# Patient Record
Sex: FEMALE | Race: BLACK OR AFRICAN AMERICAN
[De-identification: names, ages, dates, MRNs, and addresses within clinical notes are randomized per-mention and may not be internally consistent; named-entity substitution may affect disease eponyms.]

---

## 2020-03-03 ENCOUNTER — HOSPITAL ENCOUNTER (INPATIENT)
Dept: HOSPITAL 72 - EMR | Age: 60
LOS: 2 days | Discharge: NURSING, REHAB OR LONG-TERM CARE | DRG: 280 | End: 2020-03-05
Payer: COMMERCIAL

## 2020-03-03 VITALS — SYSTOLIC BLOOD PRESSURE: 181 MMHG | DIASTOLIC BLOOD PRESSURE: 81 MMHG

## 2020-03-03 VITALS — DIASTOLIC BLOOD PRESSURE: 83 MMHG | SYSTOLIC BLOOD PRESSURE: 189 MMHG

## 2020-03-03 VITALS — SYSTOLIC BLOOD PRESSURE: 157 MMHG | DIASTOLIC BLOOD PRESSURE: 76 MMHG

## 2020-03-03 VITALS — WEIGHT: 227.5 LBS | BODY MASS INDEX: 36.56 KG/M2 | HEIGHT: 66 IN

## 2020-03-03 VITALS — DIASTOLIC BLOOD PRESSURE: 84 MMHG | SYSTOLIC BLOOD PRESSURE: 156 MMHG

## 2020-03-03 DIAGNOSIS — D64.9: ICD-10-CM

## 2020-03-03 DIAGNOSIS — E87.6: ICD-10-CM

## 2020-03-03 DIAGNOSIS — E87.0: ICD-10-CM

## 2020-03-03 DIAGNOSIS — Z88.6: ICD-10-CM

## 2020-03-03 DIAGNOSIS — I34.0: ICD-10-CM

## 2020-03-03 DIAGNOSIS — I25.10: ICD-10-CM

## 2020-03-03 DIAGNOSIS — I69.351: ICD-10-CM

## 2020-03-03 DIAGNOSIS — E43: ICD-10-CM

## 2020-03-03 DIAGNOSIS — I11.0: ICD-10-CM

## 2020-03-03 DIAGNOSIS — E78.5: ICD-10-CM

## 2020-03-03 DIAGNOSIS — Z86.73: ICD-10-CM

## 2020-03-03 DIAGNOSIS — I25.2: ICD-10-CM

## 2020-03-03 DIAGNOSIS — I27.20: ICD-10-CM

## 2020-03-03 DIAGNOSIS — Z79.84: ICD-10-CM

## 2020-03-03 DIAGNOSIS — I21.4: ICD-10-CM

## 2020-03-03 DIAGNOSIS — I50.33: ICD-10-CM

## 2020-03-03 DIAGNOSIS — Z79.02: ICD-10-CM

## 2020-03-03 DIAGNOSIS — E11.40: ICD-10-CM

## 2020-03-03 DIAGNOSIS — Z88.8: ICD-10-CM

## 2020-03-03 DIAGNOSIS — Z88.0: ICD-10-CM

## 2020-03-03 DIAGNOSIS — E86.0: ICD-10-CM

## 2020-03-03 DIAGNOSIS — E88.09: ICD-10-CM

## 2020-03-03 DIAGNOSIS — I87.2: ICD-10-CM

## 2020-03-03 DIAGNOSIS — I24.9: Primary | ICD-10-CM

## 2020-03-03 LAB
ADD MANUAL DIFF: NO
ALBUMIN SERPL-MCNC: 1.8 G/DL (ref 3.4–5)
ALBUMIN/GLOB SERPL: 0.5 {RATIO} (ref 1–2.7)
ALP SERPL-CCNC: 68 U/L (ref 46–116)
ALT SERPL-CCNC: 65 U/L (ref 12–78)
ANION GAP SERPL CALC-SCNC: 8 MMOL/L (ref 5–15)
APPEARANCE UR: (no result)
APTT BLD: 25 SEC (ref 23–33)
APTT PPP: YELLOW S
AST SERPL-CCNC: 64 U/L (ref 15–37)
BASOPHILS NFR BLD AUTO: 0.4 % (ref 0–2)
BILIRUB SERPL-MCNC: 0.1 MG/DL (ref 0.2–1)
BUN SERPL-MCNC: 12 MG/DL (ref 7–18)
CALCIUM SERPL-MCNC: 8 MG/DL (ref 8.5–10.1)
CHLORIDE SERPL-SCNC: 112 MMOL/L (ref 98–107)
CO2 SERPL-SCNC: 27 MMOL/L (ref 21–32)
CREAT SERPL-MCNC: 1.1 MG/DL (ref 0.55–1.3)
EOSINOPHIL NFR BLD AUTO: 1.6 % (ref 0–3)
ERYTHROCYTE [DISTWIDTH] IN BLOOD BY AUTOMATED COUNT: 14 % (ref 11.6–14.8)
GLOBULIN SER-MCNC: 3.8 G/DL
GLUCOSE UR STRIP-MCNC: (no result) MG/DL
HCT VFR BLD CALC: 29.2 % (ref 37–47)
HGB BLD-MCNC: 9.6 G/DL (ref 12–16)
INR PPP: 0.9 (ref 0.9–1.1)
KETONES UR QL STRIP: NEGATIVE
LEUKOCYTE ESTERASE UR QL STRIP: (no result)
LYMPHOCYTES NFR BLD AUTO: 9.6 % (ref 20–45)
MCV RBC AUTO: 86 FL (ref 80–99)
MONOCYTES NFR BLD AUTO: 7.3 % (ref 1–10)
NEUTROPHILS NFR BLD AUTO: 81.2 % (ref 45–75)
NITRITE UR QL STRIP: NEGATIVE
PH UR STRIP: 7 [PH] (ref 4.5–8)
PLATELET # BLD: 383 K/UL (ref 150–450)
POTASSIUM SERPL-SCNC: 2.9 MMOL/L (ref 3.5–5.1)
PROT UR QL STRIP: (no result)
RBC # BLD AUTO: 3.39 M/UL (ref 4.2–5.4)
SODIUM SERPL-SCNC: 147 MMOL/L (ref 136–145)
SP GR UR STRIP: 1.01 (ref 1–1.03)
UROBILINOGEN UR-MCNC: NORMAL MG/DL (ref 0–1)
WBC # BLD AUTO: 8.1 K/UL (ref 4.8–10.8)

## 2020-03-03 PROCEDURE — 83540 ASSAY OF IRON: CPT

## 2020-03-03 PROCEDURE — 82607 VITAMIN B-12: CPT

## 2020-03-03 PROCEDURE — 85730 THROMBOPLASTIN TIME PARTIAL: CPT

## 2020-03-03 PROCEDURE — 96375 TX/PRO/DX INJ NEW DRUG ADDON: CPT

## 2020-03-03 PROCEDURE — 82977 ASSAY OF GGT: CPT

## 2020-03-03 PROCEDURE — 86140 C-REACTIVE PROTEIN: CPT

## 2020-03-03 PROCEDURE — 84550 ASSAY OF BLOOD/URIC ACID: CPT

## 2020-03-03 PROCEDURE — 36415 COLL VENOUS BLD VENIPUNCTURE: CPT

## 2020-03-03 PROCEDURE — 83036 HEMOGLOBIN GLYCOSYLATED A1C: CPT

## 2020-03-03 PROCEDURE — 87086 URINE CULTURE/COLONY COUNT: CPT

## 2020-03-03 PROCEDURE — 85025 COMPLETE CBC W/AUTO DIFF WBC: CPT

## 2020-03-03 PROCEDURE — 99291 CRITICAL CARE FIRST HOUR: CPT

## 2020-03-03 PROCEDURE — 71045 X-RAY EXAM CHEST 1 VIEW: CPT

## 2020-03-03 PROCEDURE — 93005 ELECTROCARDIOGRAM TRACING: CPT

## 2020-03-03 PROCEDURE — 70551 MRI BRAIN STEM W/O DYE: CPT

## 2020-03-03 PROCEDURE — 84443 ASSAY THYROID STIM HORMONE: CPT

## 2020-03-03 PROCEDURE — 80053 COMPREHEN METABOLIC PANEL: CPT

## 2020-03-03 PROCEDURE — 93970 EXTREMITY STUDY: CPT

## 2020-03-03 PROCEDURE — 80061 LIPID PANEL: CPT

## 2020-03-03 PROCEDURE — 82550 ASSAY OF CK (CPK): CPT

## 2020-03-03 PROCEDURE — 82746 ASSAY OF FOLIC ACID SERUM: CPT

## 2020-03-03 PROCEDURE — 84484 ASSAY OF TROPONIN QUANT: CPT

## 2020-03-03 PROCEDURE — 84156 ASSAY OF PROTEIN URINE: CPT

## 2020-03-03 PROCEDURE — 81003 URINALYSIS AUTO W/O SCOPE: CPT

## 2020-03-03 PROCEDURE — 81050 URINALYSIS VOLUME MEASURE: CPT

## 2020-03-03 PROCEDURE — 85610 PROTHROMBIN TIME: CPT

## 2020-03-03 PROCEDURE — 82962 GLUCOSE BLOOD TEST: CPT

## 2020-03-03 PROCEDURE — 83880 ASSAY OF NATRIURETIC PEPTIDE: CPT

## 2020-03-03 PROCEDURE — 83735 ASSAY OF MAGNESIUM: CPT

## 2020-03-03 PROCEDURE — 82728 ASSAY OF FERRITIN: CPT

## 2020-03-03 PROCEDURE — 96365 THER/PROPH/DIAG IV INF INIT: CPT

## 2020-03-03 PROCEDURE — 84100 ASSAY OF PHOSPHORUS: CPT

## 2020-03-03 PROCEDURE — 93306 TTE W/DOPPLER COMPLETE: CPT

## 2020-03-03 PROCEDURE — 83550 IRON BINDING TEST: CPT

## 2020-03-03 RX ADMIN — HEPARIN SODIUM SCH UNITS: 5000 INJECTION INTRAVENOUS; SUBCUTANEOUS at 21:26

## 2020-03-03 RX ADMIN — INSULIN ASPART SCH UNITS: 100 INJECTION, SOLUTION INTRAVENOUS; SUBCUTANEOUS at 21:00

## 2020-03-03 RX ADMIN — SPIRONOLACTONE SCH MG: 25 TABLET, FILM COATED ORAL at 21:25

## 2020-03-03 RX ADMIN — DOCUSATE SODIUM SCH MG: 100 CAPSULE, LIQUID FILLED ORAL at 17:41

## 2020-03-03 RX ADMIN — ATORVASTATIN CALCIUM SCH MG: 80 TABLET, FILM COATED ORAL at 21:25

## 2020-03-03 RX ADMIN — INSULIN ASPART SCH UNITS: 100 INJECTION, SOLUTION INTRAVENOUS; SUBCUTANEOUS at 17:00

## 2020-03-03 NOTE — DIAGNOSTIC IMAGING REPORT
Indication: Dyspnea

 

Comparison:  None

 

A single view chest radiograph was obtained.

 

Findings:

 

There may be mild pulmonary vascular congestion present given prominent hilar vessels

with suggestion of mild cephalization. No alveolar or interstitial densities are

identified. The heart is mildly enlarged. There is a probable small right pleural

effusion. Bones are unremarkable.

 

IMPRESSION:

 

Query mild pulmonary vascular congestion

## 2020-03-03 NOTE — NUR
ED Nurse Note:

pt arrives from home states she has had a headache for awhile and feeling 
dizzy. relates drove self here from [employer]. pt denies cp at this time 
accucheck as noted, md aware and pt able to consume orange juice.  repeat 
accucheck doen with lab draw and improved. repeated orange juice as pt is a/ox4 
and able to tolerate po.  pt tolerates iv start/lab draw well.  aware to obtain 
urine sample when able. radiology here to do cxr and take pt to mri.

## 2020-03-03 NOTE — NUR
ED Nurse Note:

pt c/o K+ infusion burning.  iv site reassessed and intact no edema or redness 
noted.  infusion rate decreased to 50ml/hr for comfort and ice pack applied to 
area.

## 2020-03-03 NOTE — NUR
ED Nurse Note:

pt resting in room a/ox4.  able to use bedside commode. tolerating K+ infusion 
well as infusing at slower rate now.  no new c/o

## 2020-03-03 NOTE — CONSULTATION
Consult Note


Consult Note





I was asked to evaluate at the request of Dr. Bañuelos for electrolyte management





Patient is a 59-year-old female past medical history of diabetes, CAD status 

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 

presents to the ER complaining of feeling sick.  She states that she feels 

dizzy and generally weak.  Patient states that she woke up with left-sided 

chest pain which has since resolved.  She denies any shortness of breath.  She 

denies any new focal weakness or worsening of her right eye visual loss.  She 

denies any abdominal pain, nausea or vomiting.  She also complains of leg 

swelling.  She states that she was taken off of her Lasix by her doctor.  She 

also states that she was on other blood thinners that were taken off.  She 

states that she smokes about 2 cigarettes/day.  She works for the [employer].  She states that she might retire because she is unable to 

work after her strokes and MI.


Allergies:  





     CODEINE (Verified  Allergy, Unknown, 3/3/20)


     METFORMIN (Verified  Allergy, Unknown, 3/3/20)


     PENICILLINS (Verified  Allergy, Unknown, 3/3/20)








Past Medical History:  No History, Except For


Hx Cardiac Problems:  Yes - CHF, 2 strokes


Hx Hypertension:  Yes


Hx Diabetes:  Yes


Assessment/Plan





Electrolyte imbalances


Hypokalemia


Hypertension


Anemia


Elevated troponin


Diabetes mellitus


Coronary artery disease status post MI


With right-sided facial droop and partial bright blindness


Codeine Metformin penicillin allergy








Optimize cardiac function 


Correct electrolytes


2D echocardiogram


Monitor renal parameters


Adjust blood pressure medication


Consultants











Sawyer Altamirano MD Mar 3, 2020 15:14

## 2020-03-03 NOTE — NUR
NURSE NOTES:

Admitted patient from ER to TELE rm 220-2. Patient is AAO X4, able to make needs known. 
Denies any pain at this time, no s/s of acute distress noted. Patient denies any chest pain 
at tis time. Cardiac monitor on. IV on RAC 22g is intact and patent. Noted patient has 
bilateral ankle pitting edema x2. Skin is intact and warm on touch. Called Dr. Servin for 
admission orders, awaiting response. Will continue to monitor patient.

## 2020-03-03 NOTE — DIAGNOSTIC IMAGING REPORT
Indication: 59-year-old female history of old infarct. Patient presents with right

facial droop, dizziness and weakness

 

Technique: The head was imaged in a 1.5 Tania magnet. Sequences obtained include

sagittal and axial T1 FLAIR, axial T2 fast spin echo with fat saturation, axial T2*

GRE, axial T2 FLAIR, diffusion and ADC map.

 

Comparison: None

 

There is a magnetic susceptibility artifact obscuring portions of the parietal lobe

bilaterally. There is also resultant alteration of the magnetic field in this

location obscuring and underlying detail. That said, no mass effect or edema

identified. No definite hemorrhage identified. Diffusion restriction not seen within

the visualized part of the brain that could be evaluated. There is encephalomalacia

in the left occipital lobe. There is a periventricular T2 signal which is very mild

in degree may be due to chronic small vessel disease.

 

IMPRESSION:

 

Limited study due to artifact. No acute infarct, mass effect or edema appreciated.

 

Periventricular T2 hyperintense signal may be due to chronic small vessel disease.

 

Old infarct left occipital lobe.

## 2020-03-03 NOTE — EMERGENCY ROOM REPORT
History of Present Illness


General


Chief Complaint:  Dizziness


Source:  Patient





Present Illness


HPI


Patient is a 59-year-old female past medical history of diabetes, CAD status 

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 

presents to the ER complaining of feeling sick.  She states that she feels 

dizzy and generally weak.  Patient states that she woke up with left-sided 

chest pain which has since resolved.  She denies any shortness of breath.  She 

denies any new focal weakness or worsening of her right eye visual loss.  She 

denies any abdominal pain, nausea or vomiting.  She also complains of leg 

swelling.  She states that she was taken off of her Lasix by her doctor.  She 

also states that she was on other blood thinners that were taken off.  She 

states that she smokes about 2 cigarettes/day.  She works for the [employer].  She states that she might retire because she is unable to 

work after her strokes and MI.


Allergies:  


Coded Allergies:  


     CODEINE (Verified  Allergy, Unknown, 3/3/20)


     METFORMIN (Verified  Allergy, Unknown, 3/3/20)


     PENICILLINS (Verified  Allergy, Unknown, 3/3/20)





Patient History


Social History:  Reports: smoking


Last Menstrual Period:  menopause


Reviewed Nursing Documentation:  PMH: Agreed; PSxH: Agreed





Nursing Documentation-PMH


Past Medical History:  No History, Except For


Hx Cardiac Problems:  Yes - CHF, 2 strokes


Hx Hypertension:  Yes


Hx Diabetes:  Yes





Review of Systems


All Other Systems:  negative except mentioned in HPI





Physical Exam





Vital Signs








  Date Time  Temp Pulse Resp B/P (MAP) Pulse Ox O2 Delivery O2 Flow Rate FiO2


 


3/3/20 07:35 97.7 78 16 148/75 (99) 99 Room Air  








Sp02 EP Interpretation:  reviewed, normal


General Appearance:  no apparent distress, alert, GCS 15, non-toxic


Head:  normocephalic, atraumatic


ENT:  hearing grossly normal, normal pharynx, no angioedema, normal voice, 

other - slow clear speech


Neck:  full range of motion, supple/symm/no masses


Respiratory:  chest non-tender, crackles, speaking full sentences


Cardiovascular #1:  regular rate, rhythm, no edema


Cardiovascular #2:  2+ carotid (R), 2+ carotid (L), 2+ radial (R), 2+ radial (L)

, 2+ dorsalis pedis (R), 2+ dorsalis pedis (L)


Gastrointestinal:  normal bowel sounds, non tender, soft, non-distended, no 

guarding, no rebound


Rectal:  deferred


Genitourinary:  normal inspection, no CVA tenderness


Musculoskeletal:  back normal, normal range of motion, gait/station normal, 

other - Bilateral lower extremity swelling left greater than right with mild 

tenderness to palpation no erythema


Neurologic:  alert, motor strength/tone normal, oriented x3, sensory intact, 

responsive


Psychiatric:  judgement/insight normal, memory normal, mood/affect normal, no 

suicidal/homicidal ideation


Skin:  no rash


Lymphatic:  no adenopathy





Procedures


Critical Care Time


Critical Care Time


Total critical care time: Approximately 35 minutes.  Due to a high probability 

of clinically significant, life threatening deterioration, the patient required 

my highest level of preparedness to intervene emergently and I personally spent 

this critical care time directly and personally managing the patient. This 

critical care time included obtaining a history; examining the patient; pulse 

oximetry; ordering and review of studies; arranging urgent treatment with 

development of a management plan; evaluation of patient's response to treatment

; frequent reassessment; and, discussions with other providers.This critical 

care time was performed to assess and manage the high probability of imminent, 

life-threatening deterioration that could result in multi-organ failure. It was 

exclusive of separately billable procedures and treating other patients and 

teaching time.


Please see MDM section and the rest of the note for further information on 

patient assessment and treatment.





Medical Decision Making


Diagnostic Impression:  


 Primary Impression:  


 NSTEMI (non-ST elevated myocardial infarction)


 Additional Impressions:  


 Hypokalemia


 CHF (congestive heart failure)


 Pulmonary edema


 Anemia


ER Course


Patient's blood glucose 67.  Patient given oral orange juice





Repeat BS 70, given another orange juice and D50 ordered.  Patient states she 

checked her blood glucose this morning it was 300 so she took 14 units of her 

insulin but has not eaten anything





Patient fluid overloaded BNP almost 8000 with pulmonary vascular congestion on x

-ray.  40 mg of IV Lasix has been ordered.  Patient's troponin is also elevated 

patient has no active chest pain.  Her EKG has no ST elevation.  Patient is on 

Plavix at home.  Will hold on any further blood thinners until MRI results are 

back.  Patient is hypokalemic with a potassium of 2.9, I have ordered for 40 M 

EQ's p.o. as well as 20 M EQ's IV piggyback.  Paging admitting MD for admission

  Dr. Servin.





At 925 am Dr. Servin called back.  I told him that MRI of brain to rule out 

acute stroke was pending as well as duplex of the lower extremities.  He will 

follow-up on these results and treat as needed.  I have held on any blood 

thinners pending MRI results.


EKG Diagnostic Results


EKG Time:  08:07


EP Interpretation:  MD Noemy


Rate:  normal


Rhythm:  NSR


ST Segments:  no acute changes





Rhythm Strip Diag. Results


Rhythm Strip Time:  08:26


EP Interpretation:  yes


Rate:  76


Rhythm:  NSR, no PVC's, no ectopy





Last Vital Signs








  Date Time  Temp Pulse Resp B/P (MAP) Pulse Ox O2 Delivery O2 Flow Rate FiO2


 


3/3/20 07:35 97.7 78 16 148/75 (99) 99 Room Air  








Disposition:  ADMITTED AS INPATIENT


Condition:  Critical


Physician Consult:  Dr Servin at 925 am.


Referrals:  


NON PHYSICIAN (PCP)











Sandy Salguero M.D. Mar 3, 2020 08:26

## 2020-03-03 NOTE — NUR
ED Nurse Note:

report given to tele rn, awaits pt transport with acls protocol and rn.  no new 
c/o.

## 2020-03-03 NOTE — HISTORY AND PHYSICAL REPORT
DATE OF ADMISSION:  03/03/2020

HISTORY OF PRESENT ILLNESS:  The patient is admitted for chest pain, rule

out acute coronary syndrome.  The patient has been complaining also of

back pain as well as leg edema, worsening leg edema.  The patient has

history of stroke x2 and MI in the past.  She is also diabetic.  She is

also high-risk patient for acute coronary syndrome.  The patient also

complains of dizziness and chest pain for 3 days that radiates to the

shoulder.  Denies palpitation.  Denies shortness of breath.  Does have

mild cough.



PAST MEDICAL HISTORY:  NIDDM, neuropathy, hyperlipidemia, MI, CAD, CVA x2,

leg edema.



PAST SURGICAL HISTORY:  None.



ALLERGIES:  To metformin, penicillin, codeine.



FAMILY HISTORY:  Does have history of diabetes hypertension.



SOCIAL HISTORY:  Denies history of smoking.  Denies alcohol or illicit

drugs.



MEDICATIONS:  Plavix, gabapentin, insulin, losartan.



REVIEW OF SYSTEMS:  HEENT:  Denies headaches.  RESPIRATORY:  Denies

shortness of breath.  Does have mild cough.  CARDIOVASCULAR:  Reports

chest pain for 3 days with radiation to the left shoulder.  No

palpitations.  No orthopnea.  GASTROINTESTINAL:  Denies nausea, vomiting,

or diarrhea.  Does have occasional heartburn.  EXTREMITIES:  Does have

worsening leg edema.  CENTRAL NERVOUS SYSTEM:  Denies change in speech

pattern.



PHYSICAL EXAMINATION:

VITAL SIGNS:  Temperature is 97.9, pulse is 87, blood pressure 157/76.

HEENT:  PERRLA.

NECK:  Supple.  No lymphadenopathy.

CHEST:  Clear to auscultation.

CARDIOVASCULAR:  Regular rate and rhythm.  No murmurs or extra sounds.

GASTROINTESTINAL:  Soft, nontender, nondistended.  No organomegaly.

EXTREMITIES:  1+ edema.  Reflexes on both sides.  Has generalized

weakness.



LABORATORY DATA:  WBC of 8.9, hemoglobin 9.6, platelets 383.  Sodium 147,

potassium 3.9, BUN of 12, creatinine 1.2.  Troponin 0.119.



ASSESSMENT AND PLAN:  Hypokalemia, elevated troponin, chest pain, rule out

acute coronary syndrome, leg edema.  I have asked Dr. Chen, Dr. Altamirano,  _______ see the patient for hypokalemia as well as for

chest pain management as well as acute coronary syndrome.  The patient

also ________ for a stroke.  Has history of CVA in the past.  High risk

for stroke as well.









  ______________________________________________

  Federico Servin M.D.





DR:  CLARENCE

D:  03/03/2020 23:07

T:  03/03/2020 23:20

JOB#:  [id number]

CC:

## 2020-03-03 NOTE — NUR
NURSE NOTES:

Patient is complaining of pain. Called Dr. Servin again and left message for admission 
orders and something for pain. Will continue to monitor patient.

## 2020-03-04 VITALS — SYSTOLIC BLOOD PRESSURE: 154 MMHG | DIASTOLIC BLOOD PRESSURE: 89 MMHG

## 2020-03-04 VITALS — DIASTOLIC BLOOD PRESSURE: 88 MMHG | SYSTOLIC BLOOD PRESSURE: 152 MMHG

## 2020-03-04 VITALS — DIASTOLIC BLOOD PRESSURE: 92 MMHG | SYSTOLIC BLOOD PRESSURE: 187 MMHG

## 2020-03-04 VITALS — SYSTOLIC BLOOD PRESSURE: 147 MMHG | DIASTOLIC BLOOD PRESSURE: 86 MMHG

## 2020-03-04 LAB
% IRON SATURATION: 10 % (ref 15–50)
ADD MANUAL DIFF: NO
ALBUMIN SERPL-MCNC: 1.5 G/DL (ref 3.4–5)
ALBUMIN/GLOB SERPL: 0.5 {RATIO} (ref 1–2.7)
ALP SERPL-CCNC: 61 U/L (ref 46–116)
ALT SERPL-CCNC: 60 U/L (ref 12–78)
ANION GAP SERPL CALC-SCNC: 8 MMOL/L (ref 5–15)
AST SERPL-CCNC: 51 U/L (ref 15–37)
BASOPHILS NFR BLD AUTO: 0.6 % (ref 0–2)
BILIRUB SERPL-MCNC: 0.1 MG/DL (ref 0.2–1)
BUN SERPL-MCNC: 14 MG/DL (ref 7–18)
CALCIUM SERPL-MCNC: 7.9 MG/DL (ref 8.5–10.1)
CHLORIDE SERPL-SCNC: 114 MMOL/L (ref 98–107)
CHOLEST SERPL-MCNC: 147 MG/DL (ref ?–200)
CK SERPL-CCNC: 668 U/L (ref 26–308)
CO2 SERPL-SCNC: 24 MMOL/L (ref 21–32)
CREAT SERPL-MCNC: 1.1 MG/DL (ref 0.55–1.3)
EOSINOPHIL NFR BLD AUTO: 3.5 % (ref 0–3)
ERYTHROCYTE [DISTWIDTH] IN BLOOD BY AUTOMATED COUNT: 14.5 % (ref 11.6–14.8)
FERRITIN SERPL-MCNC: 210 NG/ML (ref 8–388)
GAMMA GLUTAMYL TRANSPEPTIDASE: 41 U/L (ref 5–85)
GLOBULIN SER-MCNC: 3.1 G/DL
HCT VFR BLD CALC: 26.2 % (ref 37–47)
HDLC SERPL-MCNC: 42 MG/DL (ref 40–60)
HGB BLD-MCNC: 8.5 G/DL (ref 12–16)
IRON SERPL-MCNC: 24 UG/DL (ref 50–175)
LYMPHOCYTES NFR BLD AUTO: 15.6 % (ref 20–45)
MCV RBC AUTO: 86 FL (ref 80–99)
MONOCYTES NFR BLD AUTO: 9.5 % (ref 1–10)
NEUTROPHILS NFR BLD AUTO: 70.8 % (ref 45–75)
PHOSPHATE SERPL-MCNC: 3.5 MG/DL (ref 2.5–4.9)
PLATELET # BLD: 334 K/UL (ref 150–450)
POTASSIUM SERPL-SCNC: 4 MMOL/L (ref 3.5–5.1)
RBC # BLD AUTO: 3.04 M/UL (ref 4.2–5.4)
SODIUM SERPL-SCNC: 146 MMOL/L (ref 136–145)
TIBC SERPL-MCNC: 239 UG/DL (ref 250–450)
TRIGL SERPL-MCNC: 80 MG/DL (ref 30–150)
UNSATURATED IRON BINDING: 215 UG/DL (ref 112–346)
WBC # BLD AUTO: 6.1 K/UL (ref 4.8–10.8)

## 2020-03-04 RX ADMIN — DOCUSATE SODIUM SCH MG: 100 CAPSULE, LIQUID FILLED ORAL at 17:45

## 2020-03-04 RX ADMIN — ATORVASTATIN CALCIUM SCH MG: 80 TABLET, FILM COATED ORAL at 21:18

## 2020-03-04 RX ADMIN — LOSARTAN POTASSIUM SCH MG: 50 TABLET, FILM COATED ORAL at 09:29

## 2020-03-04 RX ADMIN — INSULIN ASPART SCH UNITS: 100 INJECTION, SOLUTION INTRAVENOUS; SUBCUTANEOUS at 16:30

## 2020-03-04 RX ADMIN — HEPARIN SODIUM SCH UNITS: 5000 INJECTION INTRAVENOUS; SUBCUTANEOUS at 09:32

## 2020-03-04 RX ADMIN — DOCUSATE SODIUM SCH MG: 100 CAPSULE, LIQUID FILLED ORAL at 12:50

## 2020-03-04 RX ADMIN — INSULIN ASPART SCH UNITS: 100 INJECTION, SOLUTION INTRAVENOUS; SUBCUTANEOUS at 21:24

## 2020-03-04 RX ADMIN — SPIRONOLACTONE SCH MG: 25 TABLET, FILM COATED ORAL at 21:18

## 2020-03-04 RX ADMIN — SPIRONOLACTONE SCH MG: 25 TABLET, FILM COATED ORAL at 09:29

## 2020-03-04 RX ADMIN — HYDRALAZINE HYDROCHLORIDE PRN MG: 25 TABLET ORAL at 21:19

## 2020-03-04 RX ADMIN — INSULIN ASPART SCH UNITS: 100 INJECTION, SOLUTION INTRAVENOUS; SUBCUTANEOUS at 12:15

## 2020-03-04 RX ADMIN — DOCUSATE SODIUM SCH MG: 100 CAPSULE, LIQUID FILLED ORAL at 09:30

## 2020-03-04 RX ADMIN — ENOXAPARIN SODIUM SCH MG: 100 INJECTION SUBCUTANEOUS at 17:48

## 2020-03-04 RX ADMIN — INSULIN ASPART SCH UNITS: 100 INJECTION, SOLUTION INTRAVENOUS; SUBCUTANEOUS at 05:54

## 2020-03-04 NOTE — CARDIOLOGY PROGRESS NOTE
Assessment/Plan


Assessment/Plan


1. NSTEMI, in view of CAD, s/p AMI, and new onset chest pain, requires to be 

scheduled for heart cath. Will arrange for transfer to [facility], if she agrees. 

Will require lovenox, ASA, statins and B-blockers. 


2. Acute HFpEF, high BNP, lower extremity edema and high E/E' ratio on echo are 

suggestive of HFpEF which could be ischemic or hypertensive, 12 lead ECG shows 

non-specific STT changes and no Q waves.


3. Mild pulmonary HTN.


4. Moderate MR.


5. Hx of CVA x2 with memory loss.





Subjective


Subjective


Sinus rhythm at rate of 80.


Denies chest pain or SOB, complains about LE edema.





Objective





Last 24 Hour Vital Signs








  Date Time  Temp Pulse Resp B/P (MAP) Pulse Ox O2 Delivery O2 Flow Rate FiO2


 


3/4/20 12:00  78      


 


3/4/20 12:00 98.4 80 18 147/86 (106) 96   


 


3/4/20 09:29    152/88    


 


3/4/20 08:00  76      


 


3/4/20 08:00 97.5 76 18 152/88 (109) 98   


 


3/4/20 04:00  79      


 


3/4/20 00:00  89      

















Intake and Output  


 


 3/3/20 3/4/20





 18:59 06:59


 


Intake Total 1180 ml 


 


Output Total 800 ml 


 


Balance 380 ml 


 


  


 


Intake Oral 880 ml 


 


IV Total 300 ml 


 


Output Urine Total 800 ml 


 


# Voids 1 1


 


# Bowel Movements  1








2D Echo:  EF 55%, Normal LV Diast. fxn, Hi intracard filling press, Mod MR, 

RVSP 40





Laboratory Tests








Test


  3/4/20


06:35


 


White Blood Count


  6.1 K/UL


(4.8-10.8)


 


Red Blood Count


  3.04 M/UL


(4.20-5.40)  L


 


Hemoglobin


  8.5 G/DL


(12.0-16.0)  L


 


Hematocrit


  26.2 %


(37.0-47.0)  L


 


Mean Corpuscular Volume 86 FL (80-99)  


 


Mean Corpuscular Hemoglobin


  28.1 PG


(27.0-31.0)


 


Mean Corpuscular Hemoglobin


Concent 32.6 G/DL


(32.0-36.0)


 


Red Cell Distribution Width


  14.5 %


(11.6-14.8)


 


Platelet Count


  334 K/UL


(150-450)


 


Mean Platelet Volume


  6.2 FL


(6.5-10.1)  L


 


Neutrophils (%) (Auto)


  70.8 %


(45.0-75.0)


 


Lymphocytes (%) (Auto)


  15.6 %


(20.0-45.0)  L


 


Monocytes (%) (Auto)


  9.5 %


(1.0-10.0)


 


Eosinophils (%) (Auto)


  3.5 %


(0.0-3.0)  H


 


Basophils (%) (Auto)


  0.6 %


(0.0-2.0)


 


Sodium Level


  146 MMOL/L


(136-145)  H


 


Potassium Level


  4.0 MMOL/L


(3.5-5.1)


 


Chloride Level


  114 MMOL/L


()  H


 


Carbon Dioxide Level


  24 MMOL/L


(21-32)


 


Anion Gap


  8 mmol/L


(5-15)


 


Blood Urea Nitrogen


  14 mg/dL


(7-18)


 


Creatinine


  1.1 MG/DL


(0.55-1.30)


 


Estimat Glomerular Filtration


Rate > 60 mL/min


(>60)


 


Glucose Level


  132 MG/DL


()  H


 


Hemoglobin A1c


  5.4 %


(4.3-6.0)


 


Uric Acid


  6.2 MG/DL


(2.6-7.2)


 


Calcium Level


  7.9 MG/DL


(8.5-10.1)  L


 


Phosphorus Level


  3.5 MG/DL


(2.5-4.9)


 


Magnesium Level


  2.0 MG/DL


(1.8-2.4)


 


Iron Level


  24 ug/dL


()  L


 


Total Iron Binding Capacity


  239 ug/dL


(250-450)  L


 


Percent Iron Saturation 10 % (15-50)  L


 


Unsaturated Iron Binding


  215 ug/dL


(112-346)


 


Ferritin


  210 NG/ML


(8-388)


 


Total Bilirubin


  0.1 MG/DL


(0.2-1.0)  L


 


Gamma Glutamyl Transpeptidase 41 U/L (5-85)  


 


Aspartate Amino Transf


(AST/SGOT) 51 U/L (15-37)


H


 


Alanine Aminotransferase


(ALT/SGPT) 60 U/L (12-78)


 


 


Alkaline Phosphatase


  61 U/L


()


 


Total Creatine Kinase


  668 U/L


()  H


 


Troponin I


  0.089 ng/mL


(0.000-0.056)


 


C-Reactive Protein,


Quantitative < 0.4 mg/dL


(0.00-0.90)


 


Pro-B-Type Natriuretic Peptide


  4991 pg/mL


(0-125)  H


 


Total Protein


  4.6 G/DL


(6.4-8.2)  L


 


Albumin


  1.5 G/DL


(3.4-5.0)  L


 


Globulin 3.1 g/dL  


 


Albumin/Globulin Ratio


  0.5 (1.0-2.7)


L


 


Triglycerides Level


  80 MG/DL


()


 


Cholesterol Level


  147 MG/DL (<


200)


 


LDL Cholesterol


  88 mg/dL


(<100)


 


HDL Cholesterol


  42 MG/DL


(40-60)


 


Cholesterol/HDL Ratio 3.5 (3.3-4.4)  


 


Vitamin B12 Level


  687 PG/ML


(193-986)


 


Folate


  15.1 NG/ML


(8.6-58.9)


 


Thyroid Stimulating Hormone


(TSH) 2.936 uiU/mL


(0.358-3.740)











Microbiology








 Date/Time


Source Procedure


Growth Status


 


 


 3/3/20 10:25


Urine,Clean Catch Urine Culture - Preliminary Resulted








Objective


HEENT:  Atraumatic, normocephalic, PERRLA, EOMI.


NECK: Jugular venous pressure normal.  No carotid bruits.


LUNGS:  Clear.


CARDIAC:  Regular rhythm and rate.  Normal S1 and S2 with a fourth heart sound.


ABDOMEN:  Soft and nontender, + BS, no HSM.


EXTREMITIES:  3+ B/L lower extremity edema.


NEUROLOGIC:  With mild right-sided weakness.











Cirilo Chen MD Mar 4, 2020 16:29

## 2020-03-04 NOTE — NUR
NURSE NOTES:



Received report from DIONTE Bishop. Patient resting in bed. She has no signs of distress but is 
experiencing pain. IV site patent and flushed. No signs of erythema, infiltration, or 
bleeding. Bed in the lowest position, brakes on, side rails up x 3, and call light within 
reach. Will continue plan of care.

## 2020-03-04 NOTE — GENERAL PROGRESS NOTE
Assessment/Plan


Problem List:  


(1) Hypokalemia


ICD Codes:  E87.6 - Hypokalemia


SNOMED:  01559516


(2) CHF (congestive heart failure)


ICD Codes:  I50.9 - Heart failure, unspecified


SNOMED:  20090700


(3) Pulmonary edema


ICD Codes:  J81.1 - Chronic pulmonary edema


SNOMED:  77780435


(4) NSTEMI (non-ST elevated myocardial infarction)


ICD Codes:  I21.4 - Non-ST elevation (NSTEMI) myocardial infarction


SNOMED:  36183559


(5) Chest pain


ICD Codes:  R07.9 - Chest pain, unspecified


SNOMED:  67324807


(6) Anemia


ICD Codes:  D64.9 - Anemia, unspecified


SNOMED:  798861515


(7) HTN (hypertension)


ICD Codes:  I10 - Essential (primary) hypertension


SNOMED:  12291078


Status:  progressing


Assessment/Plan:


afebrile


no cp


anemia


hypokalemia resolved


nstemi


stress test per dr neff





Subjective


ROS Limited/Unobtainable:  Yes


Allergies:  


Coded Allergies:  


     CODEINE (Verified  Allergy, Unknown, 3/3/20)


     METFORMIN (Verified  Allergy, Unknown, 3/3/20)


     PENICILLINS (Verified  Allergy, Unknown, 3/3/20)





Objective





Last 24 Hour Vital Signs








  Date Time  Temp Pulse Resp B/P (MAP) Pulse Ox O2 Delivery O2 Flow Rate FiO2


 


3/4/20 21:19    187/92    


 


3/4/20 21:18  83  187/92    


 


3/4/20 17:45  78  154/89    


 


3/4/20 16:00  78      


 


3/4/20 16:00 98.3 78 18 154/89 (110) 96   


 


3/4/20 12:00  78      


 


3/4/20 12:00 98.4 80 18 147/86 (106) 96   


 


3/4/20 09:29    152/88    


 


3/4/20 08:00  76      


 


3/4/20 08:00 97.5 76 18 152/88 (109) 98   


 


3/4/20 04:00  79      


 


3/4/20 00:00  89      

















Intake and Output  


 


 3/3/20 3/4/20





 19:00 07:00


 


Intake Total 1180 ml 


 


Output Total 800 ml 


 


Balance 380 ml 


 


  


 


Intake Oral 880 ml 


 


IV Total 300 ml 


 


Output Urine Total 800 ml 


 


# Voids 1 1


 


# Bowel Movements  1








Laboratory Tests


3/4/20 06:35: 


White Blood Count 6.1, Red Blood Count 3.04L, Hemoglobin 8.5L, Hematocrit 26.2L

, Mean Corpuscular Volume 86, Mean Corpuscular Hemoglobin 28.1, Mean 

Corpuscular Hemoglobin Concent 32.6, Red Cell Distribution Width 14.5, Platelet 

Count 334, Mean Platelet Volume 6.2L, Neutrophils (%) (Auto) 70.8, Lymphocytes (

%) (Auto) 15.6L, Monocytes (%) (Auto) 9.5, Eosinophils (%) (Auto) 3.5H, 

Basophils (%) (Auto) 0.6, Sodium Level 146H, Potassium Level 4.0, Chloride 

Level 114H, Carbon Dioxide Level 24, Anion Gap 8, Blood Urea Nitrogen 14, 

Creatinine 1.1, Estimat Glomerular Filtration Rate > 60, Glucose Level 132H, 

Hemoglobin A1c 5.4, Uric Acid 6.2, Calcium Level 7.9L, Phosphorus Level 3.5, 

Magnesium Level 2.0, Iron Level 24L, Total Iron Binding Capacity 239L, Percent 

Iron Saturation 10L, Unsaturated Iron Binding 215, Ferritin 210, Total 

Bilirubin 0.1L, Gamma Glutamyl Transpeptidase 41, Aspartate Amino Transf (AST/

SGOT) 51H, Alanine Aminotransferase (ALT/SGPT) 60, Alkaline Phosphatase 61, 

Total Creatine Kinase 668H, Troponin I 0.089H, C-Reactive Protein, Quantitative 

< 0.4, Pro-B-Type Natriuretic Peptide 4991H, Total Protein 4.6L, Albumin 1.5L, 

Globulin 3.1, Albumin/Globulin Ratio 0.5L, Triglycerides Level 80, Cholesterol 

Level 147, LDL Cholesterol 88, HDL Cholesterol 42, Cholesterol/HDL Ratio 3.5, 

Vitamin B12 Level 687, Folate 15.1, Thyroid Stimulating Hormone (TSH) 2.936


Height (Feet):  5


Height (Inches):  6.00


Weight (Pounds):  227


Cardiovascular:  normal rate


Respiratory/Chest:  lungs clear


Abdomen:  soft











Federico Servin MD Mar 4, 2020 22:31

## 2020-03-04 NOTE — CONSULTATION
History of Present Illness


General


Chief Complaint:  Dizziness





Present Illness


Allergies:  


Coded Allergies:  


     CODEINE (Verified  Allergy, Unknown, 3/3/20)


     METFORMIN (Verified  Allergy, Unknown, 3/3/20)


     PENICILLINS (Verified  Allergy, Unknown, 3/3/20)





Medication History


Scheduled


Atorvastatin Calcium* (Atorvastatin Calcium*), 80 MG ORAL BEDTIME, (Reported)


Clopidogrel* (Clopidogrel*), 75 MG ORAL DAILY, (Reported)


Gabapentin* (Gabapentin*), 100 MG ORAL THREE TIMES A DAY, (Reported)


Insulin Detemir (Levemir), 0 SUBQ BEDTIME, (Reported)


Losartan Potassium* (Losartan Potassium*), 100 MG ORAL DAILY, (Reported)





Miscellaneous Medications


Insulin Aspart (Novolog), 100 UNIT SQ, (Reported)





Patient History


Healthcare decision maker





Resuscitation status


Full Code


Advanced Directive on File


No





Physical Exam





Last 24 Hour Vital Signs








  Date Time  Temp Pulse Resp B/P (MAP) Pulse Ox O2 Delivery O2 Flow Rate FiO2


 


3/4/20 17:45  78  154/89    


 


3/4/20 16:00  78      


 


3/4/20 16:00 98.3 78 18 154/89 (110) 96   


 


3/4/20 12:00  78      


 


3/4/20 12:00 98.4 80 18 147/86 (106) 96   


 


3/4/20 09:29    152/88    


 


3/4/20 08:00  76      


 


3/4/20 08:00 97.5 76 18 152/88 (109) 98   


 


3/4/20 04:00  79      


 


3/4/20 00:00  89      

















Intake and Output  


 


 3/3/20 3/4/20





 18:59 06:59


 


Intake Total 1180 ml 


 


Output Total 800 ml 


 


Balance 380 ml 


 


  


 


Intake Oral 880 ml 


 


IV Total 300 ml 


 


Output Urine Total 800 ml 


 


# Voids 1 1


 


# Bowel Movements  1











Laboratory Tests








Test


  3/4/20


06:35


 


White Blood Count


  6.1 K/UL


(4.8-10.8)


 


Red Blood Count


  3.04 M/UL


(4.20-5.40)  L


 


Hemoglobin


  8.5 G/DL


(12.0-16.0)  L


 


Hematocrit


  26.2 %


(37.0-47.0)  L


 


Mean Corpuscular Volume 86 FL (80-99)  


 


Mean Corpuscular Hemoglobin


  28.1 PG


(27.0-31.0)


 


Mean Corpuscular Hemoglobin


Concent 32.6 G/DL


(32.0-36.0)


 


Red Cell Distribution Width


  14.5 %


(11.6-14.8)


 


Platelet Count


  334 K/UL


(150-450)


 


Mean Platelet Volume


  6.2 FL


(6.5-10.1)  L


 


Neutrophils (%) (Auto)


  70.8 %


(45.0-75.0)


 


Lymphocytes (%) (Auto)


  15.6 %


(20.0-45.0)  L


 


Monocytes (%) (Auto)


  9.5 %


(1.0-10.0)


 


Eosinophils (%) (Auto)


  3.5 %


(0.0-3.0)  H


 


Basophils (%) (Auto)


  0.6 %


(0.0-2.0)


 


Sodium Level


  146 MMOL/L


(136-145)  H


 


Potassium Level


  4.0 MMOL/L


(3.5-5.1)


 


Chloride Level


  114 MMOL/L


()  H


 


Carbon Dioxide Level


  24 MMOL/L


(21-32)


 


Anion Gap


  8 mmol/L


(5-15)


 


Blood Urea Nitrogen


  14 mg/dL


(7-18)


 


Creatinine


  1.1 MG/DL


(0.55-1.30)


 


Estimat Glomerular Filtration


Rate > 60 mL/min


(>60)


 


Glucose Level


  132 MG/DL


()  H


 


Hemoglobin A1c


  5.4 %


(4.3-6.0)


 


Uric Acid


  6.2 MG/DL


(2.6-7.2)


 


Calcium Level


  7.9 MG/DL


(8.5-10.1)  L


 


Phosphorus Level


  3.5 MG/DL


(2.5-4.9)


 


Magnesium Level


  2.0 MG/DL


(1.8-2.4)


 


Iron Level


  24 ug/dL


()  L


 


Total Iron Binding Capacity


  239 ug/dL


(250-450)  L


 


Percent Iron Saturation 10 % (15-50)  L


 


Unsaturated Iron Binding


  215 ug/dL


(112-346)


 


Ferritin


  210 NG/ML


(8-388)


 


Total Bilirubin


  0.1 MG/DL


(0.2-1.0)  L


 


Gamma Glutamyl Transpeptidase 41 U/L (5-85)  


 


Aspartate Amino Transf


(AST/SGOT) 51 U/L (15-37)


H


 


Alanine Aminotransferase


(ALT/SGPT) 60 U/L (12-78)


 


 


Alkaline Phosphatase


  61 U/L


()


 


Total Creatine Kinase


  668 U/L


()  H


 


Troponin I


  0.089 ng/mL


(0.000-0.056)


 


C-Reactive Protein,


Quantitative < 0.4 mg/dL


(0.00-0.90)


 


Pro-B-Type Natriuretic Peptide


  4991 pg/mL


(0-125)  H


 


Total Protein


  4.6 G/DL


(6.4-8.2)  L


 


Albumin


  1.5 G/DL


(3.4-5.0)  L


 


Globulin 3.1 g/dL  


 


Albumin/Globulin Ratio


  0.5 (1.0-2.7)


L


 


Triglycerides Level


  80 MG/DL


()


 


Cholesterol Level


  147 MG/DL (<


200)


 


LDL Cholesterol


  88 mg/dL


(<100)


 


HDL Cholesterol


  42 MG/DL


(40-60)


 


Cholesterol/HDL Ratio 3.5 (3.3-4.4)  


 


Vitamin B12 Level


  687 PG/ML


(193-986)


 


Folate


  15.1 NG/ML


(8.6-58.9)


 


Thyroid Stimulating Hormone


(TSH) 2.936 uiU/mL


(0.358-3.740)








Height (Feet):  5


Height (Inches):  6.00


Weight (Pounds):  227


Medications





Current Medications








 Medications


  (Trade)  Dose


 Ordered  Sig/Iman


 Route


 PRN Reason  Start Time


 Stop Time Status Last Admin


Dose Admin


 


 Acetaminophen


  (Tylenol)  650 mg  Q4H  PRN


 ORAL


 Mild Pain/Temp > 100.5  3/3/20 15:45


 20 15:44  3/3/20 16:10


 


 


 Atorvastatin


 Calcium


  (Lipitor)  80 mg  BEDTIME


 ORAL


   3/3/20 21:00


 20 20:59  3/3/20 21:25


 


 


 Bisacodyl


  (Dulcolax)  10 mg  DAILYPRN  PRN


 ORAL


 Constipation  3/4/20 13:30


 20 15:44   


 


 


 Clopidogrel


 Bisulfate


  (Plavix)  75 mg  DAILY


 ORAL


   3/4/20 09:00


 4/3/20 08:59  3/4/20 09:27


 


 


 Dextrose


  (Dextrose 50%)  25 ml  Q30M  PRN


 IV


 Hypoglycemia  3/3/20 16:15


 20 16:14   


 


 


 Dextrose


  (Dextrose 50%)  50 ml  Q30M  PRN


 IV


 Hypoglycemia  3/3/20 16:15


 20 16:14   


 


 


 Docusate Sodium


  (Colace)  100 mg  THREE TIMES A  DAY


 ORAL


   3/3/20 18:00


 20 17:59  3/4/20 17:45


 


 


 Enoxaparin Sodium


  (Lovenox)  100 mg  EVERY 12  HOURS


 SUBQ


   3/4/20 17:00


 4/3/20 16:59  3/4/20 17:48


 


 


 Furosemide


  (Lasix)  40 mg  DAILY


 IV


   3/4/20 09:00


 4/3/20 08:59  3/4/20 09:30


 


 


 Gabapentin


  (Neurontin)  100 mg  THREE TIMES A  DAY


 ORAL


   3/3/20 18:00


 20 17:59  3/4/20 17:45


 


 


 Hydralazine HCl


  (Apresoline)  25 mg  Q4H  PRN


 ORAL


 bp over 160 syst  3/3/20 15:30


 20 15:29   


 


 


 Insulin Aspart


  (NovoLOG)    BEFORE MEALS AND  HS


 SUBQ


   3/3/20 17:00


 20 16:59  3/4/20 12:15


 


 


 Losartan Potassium


  (Cozaar)  100 mg  DAILY


 ORAL


   3/4/20 09:00


 4/3/20 08:59  3/4/20 09:29


 


 


 Metoprolol


 Tartrate


  (Lopressor)  25 mg  Q12HR


 ORAL


   3/4/20 17:00


 4/3/20 16:59  3/4/20 17:45


 


 


 Pantoprazole


  (Protonix)  40 mg  EVERY 12  HOURS


 ORAL


   3/3/20 21:00


 20 20:59  3/4/20 09:30


 


 


 Senna/Docusate


 Sodium


  (Lily-Colace)  1 tab  DAILY


 ORAL


   3/5/20 09:00


 20 08:59   


 


 


 Spironolactone


  (Aldactone)  25 mg  EVERY 12  HOURS


 ORAL


   3/3/20 21:00


 20 20:59  3/4/20 09:29


 











Assessment/Plan


Assessment/Plan:


Heme Consult Note





REQ MD: Federico Bañuelos


RFC: Anemia eval


DOS: 3/4/20





ID


Patient is a 59-year-old female past medical history of diabetes, CAD status 

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 

presents to the ER complaining of feeling sick.  She states that she feels 

dizzy and generally weak.  Patient states that she woke up with left-sided 

chest pain which has since resolved.  She denies any shortness of breath.  She 

denies any new focal weakness or worsening of her right eye visual loss.  She 

denies any abdominal pain, nausea or vomiting.  She also complains of leg 

swelling.  She states that she was taken off of her Lasix by her doctor.  She 

also states that she was on other blood thinners that were taken off.  She 

states that she smokes about 2 cigarettes/day.  She works for the [employer].  She states that she might retire because she is unable to 

work after her strokes and MI.





Labs and meds are noted, seen by cards, renal, labs reviewed, with worsening 

anemia at this time, w/u ordered is on lovenox and plavix currently as per 

cards.





Allergies:  


Coded Allergies:  


     CODEINE (Verified  Allergy, Unknown, 3/3/20)


     METFORMIN (Verified  Allergy, Unknown, 3/3/20)


     PENICILLINS (Verified  Allergy, Unknown, 3/3/20)





Patient History


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 

maintenance


Last Menstrual Period:  menopause


Reviewed Nursing Documentation:  PMH: Agreed; PSxH: Agreed


Fam hx: mom  of cancer as well as sister





Nursing Documentation-PMH


Past Medical History:  No History, Except For


Hx Cardiac Problems:  Yes - CHF, 2 strokes


Hx Hypertension:  Yes


Hx Diabetes:  Yes





ROS (review of systems):


Constitutional: No fever, no chills, no night sweats, no fatigue


Skin: No rashes, lumps, itchiness, dryness


HEENT: No HA, ear ache, visual changes, double vision, nosebleeds


Breasts: No lumps, pain, discharge


Pulmonary: No cough, sputum, shortness of breath, coughing up blood


Cardiovascular: No chest pain, tightness, palpitations, syncope, PND


GI: No nausea, vomiting, diarrhea, melena, hematochezia, change in appetite,


: No dysuria, frequency, urgency, urinary incontinence, foamy urine


Musculoskeletal: No joint swelling or muscle pain, trauma, back pain


Neurologic: No dizziness, fainting, seizures, changes in smell or taste


Psychiatric: No nervousness, stress, or depression, anxiety, hallucinations


Endocrine: No weight change, heat or cold intolerance, tremor, insomnia





Physical Exam:


Vitals: reviewed


General: NAD


HEENT: nc, at


Neck: supple


Chest: clear breath sounds bilaterally


Cardiovascular: RRR, no s3, s4


Abdomen: soft, nontender, nd


Extremities: no cce, normal range of motion


Neuro: alert and oriented





Labs: noted





Imaging: reviewed





Assessment and Recs:


# Anemia of chronic disease due to underlying chronic medical issues, 

multifactorial v Gi bleed 


--> Anemia workup has been ordered, rule out gi bleed 


--> No evidence of hemolysis is noted, peripheral smear has been reviewed.


--> Hgb goal >7. Transfuse prn.


--> Epogen or iron at this time is not particularly indicated


--> Medications have been reviewed


--> low threshold for gi evaluation in case has occult +


--> bone marrow biopsy is not indicated given the other more likely causes


# NSTEMI (non-ST elevated myocardial infarction)


--> may need tx to [facility] for heart cath


# Hypokalemia


--> replete with kcl


# CHF (congestive heart failure)


--> diurese per cards


# Pulmonary edema


#  Dvt ppx plavix now and lovenox sq





The timing of this note does not necessarily reflect the time of the patient 

was seen.





Greatly appreciate consultation.











Eric Candelario MD Mar 4, 2020 20:31

## 2020-03-04 NOTE — NUR
NURSE NOTES:

Received patient from Saloni RAPP in bed, denies any pain at this this time. No s/s of 
respiratory distress or acute distress noted. Bed is in lowest position with bedside rails 
up x2. Call light is within reach. Will continue with the plan of care.

## 2020-03-04 NOTE — CONSULTATION
DATE OF CONSULTATION:  03/03/2020

CARDIOLOGY CONSULTATION



CONSULTING PHYSICIAN:  Quintin Osman M.D.



REQUESTING PHYSICIAN:  Federico Servin M.D.



REASON FOR CONSULTATION:  Chest pain.



HISTORY OF PRESENT ILLNESS:  This 59-year-old female has had chest pain,

back pain, and leg swelling.  She has had a prior stroke and heart attack.

She denies any coronary interventions.  She takes her medications as

prescribed, but recently had her blood thinner and diuretic discontinued

_____ reasons that she is not sure.  I have been asked to assist with

cardiovascular care.



PAST MEDICAL HISTORY:  Includes hyperlipidemia, coronary artery disease,

history of myocardial infarction, history of cerebrovascular accident x2

dating back five years, type 2 diabetes mellitus, diabetic neuropathy, and

chronic venous insufficiency.



ALLERGIES:  Include metformin, codeine, and penicillin.



FAMILY HISTORY:  Notable for diabetes.



SOCIAL HISTORY:  Negative for smoking, alcohol, or substance abuse.



MEDICATIONS:  Prior to admission, reviewed and reconciled.



REVIEW OF SYSTEMS:  A 10-point review of systems performed, all systems

negative other than noted above.



PHYSICAL EXAMINATION:

VITAL SIGNS:  Afebrile, blood pressure 157/76, heart rate 87, and

respiratory rate 20.

HEENT:  Conjunctivae pink.  Oropharynx is clear.

NECK:  Supple.  Jugular venous pressure normal.  No bruits.

LUNGS:  Clear.

CARDIAC:  Regular rhythm and rate.  Normal S1 and S2 with a fourth heart

sound.

ABDOMEN:  Soft and nontender.

EXTREMITIES:  1+ dependent lower extremity edema.

NEUROLOGIC:  With mild right-sided weakness.



LABORATORY DATA:  White count 8.1, hemoglobin 9.6, and platelets 383,000.

Pro-natriuretic peptide 7330.  Troponin 0.119.  BUN 12, creatinine 1.1,

sodium 147, potassium 3.9, and bicarbonate 112.



IMPRESSION:

1. Acute coronary syndrome.

2. Possible non-ST elevation myocardial infarction.

3. Coronary artery disease with prior history of myocardial

infarction.

4. Cerebrovascular disease with right-sided weakness and prior strokes

x2.

5. Acute on chronic diastolic congestive heart failure.

6. Severe protein-calorie malnutrition.

7. Dehydration.

8. Hypernatremia.

9. Hypokalemia.



PLAN:

1. Cardiac monitoring.

2. Anti-platelet therapy.

3. Statin drug.

4. Check full lipid panel.

5. Protein supplement.

6. Beta-blockade.

7. Diuresis.

8. Free water replacement.

9. Potassium supplement.

10. Check magnesium.

11. We will attempt to contact primary care physician to expand database

_____ prior indications for anticoagulant therapy.









  ______________________________________________

  Quintin Osman M.D.





DR:  DEACON

D:  03/04/2020 03:30

T:  03/04/2020 04:36

JOB#:  [id number]

CC:

## 2020-03-04 NOTE — NUR
I endorsed to Edna RAPP. the patient had a BM this am. I also endorsed slightly elevated BP. 
I also endorsed the plan of care including 2D Echo today.

## 2020-03-04 NOTE — NUR
*-* INSURANCE *-*



ALL AVAILABLE CLINICALS HAVE BEEN FAXED TO:



[facility]

AUTH# [id number]

FAX ALL CLINICALS  [phone]

## 2020-03-04 NOTE — NUR
NURSE NOTES:



Received new order from Dr. Servin for Morphine 2mg IV q4hr PRN medication. Noted and 
carried out.

## 2020-03-04 NOTE — NUR
NURSE NOTES:



According to patient, she believes she has taken Morphine before, but did not have a 
reaction to it.

## 2020-03-04 NOTE — NEPHROLOGY PROGRESS NOTE
Assessment/Plan


Problem List:  


(1) Hypoalbuminemia


(2) Hypokalemia


(3) Anemia


(4) NSTEMI (non-ST elevated myocardial infarction)


(5) HTN (hypertension)


Assessment





Electrolyte imbalances


Hypokalemia


Hypertension


Hypoalbuminemia and proteinuria


Anemia


Elevated troponin


Diabetes mellitus


Coronary artery disease status post MI


With right-sided facial droop and partial bright blindness


Codeine Metformin penicillin allergy


Plan





24-hour urine for protein collection


IV iron


Optimize cardiac function 


Correct electrolytes


2D echocardiogram


Monitor renal parameters


Adjust blood pressure medication


Consultants





Subjective


ROS Limited/Unobtainable:  No


Constitutional:  Reports: malaise





Objective


Objective





Last 24 Hour Vital Signs








  Date Time  Temp Pulse Resp B/P (MAP) Pulse Ox O2 Delivery O2 Flow Rate FiO2


 


3/4/20 12:00  78      


 


3/4/20 12:00 98.4 80 18 147/86 (106) 96   


 


3/4/20 09:29    152/88    


 


3/4/20 08:00  76      


 


3/4/20 08:00 97.5 76 18 152/88 (109) 98   


 


3/4/20 04:00  79      


 


3/4/20 00:00  89      


 


3/3/20 16:00  87      


 


3/3/20 14:53      Room Air  


 


3/3/20 14:40      Room Air  


 


3/3/20 14:00  89      

















Intake and Output  


 


 3/3/20 3/4/20





 19:00 07:00


 


Intake Total 1180 ml 


 


Output Total 800 ml 


 


Balance 380 ml 


 


  


 


Intake Oral 880 ml 


 


IV Total 300 ml 


 


Output Urine Total 800 ml 


 


# Voids 1 1


 


# Bowel Movements  1








Laboratory Tests


3/4/20 06:35: 


White Blood Count 6.1, Red Blood Count 3.04L, Hemoglobin 8.5L, Hematocrit 26.2L

, Mean Corpuscular Volume 86, Mean Corpuscular Hemoglobin 28.1, Mean 

Corpuscular Hemoglobin Concent 32.6, Red Cell Distribution Width 14.5, Platelet 

Count 334, Mean Platelet Volume 6.2L, Neutrophils (%) (Auto) 70.8, Lymphocytes (

%) (Auto) 15.6L, Monocytes (%) (Auto) 9.5, Eosinophils (%) (Auto) 3.5H, 

Basophils (%) (Auto) 0.6, Sodium Level 146H, Potassium Level 4.0, Chloride 

Level 114H, Carbon Dioxide Level 24, Anion Gap 8, Blood Urea Nitrogen 14, 

Creatinine 1.1, Estimat Glomerular Filtration Rate > 60, Glucose Level 132H, 

Hemoglobin A1c 5.4, Uric Acid 6.2, Calcium Level 7.9L, Phosphorus Level 3.5, 

Magnesium Level 2.0, Iron Level 24L, Total Iron Binding Capacity 239L, Percent 

Iron Saturation 10L, Unsaturated Iron Binding 215, Ferritin 210, Total 

Bilirubin 0.1L, Gamma Glutamyl Transpeptidase 41, Aspartate Amino Transf (AST/

SGOT) 51H, Alanine Aminotransferase (ALT/SGPT) 60, Alkaline Phosphatase 61, 

Total Creatine Kinase 668H, Troponin I 0.089H, C-Reactive Protein, Quantitative 

< 0.4, Pro-B-Type Natriuretic Peptide 4991H, Total Protein 4.6L, Albumin 1.5L, 

Globulin 3.1, Albumin/Globulin Ratio 0.5L, Triglycerides Level 80, Cholesterol 

Level 147, LDL Cholesterol 88, HDL Cholesterol 42, Cholesterol/HDL Ratio 3.5, 

Vitamin B12 Level 687, Folate 15.1, Thyroid Stimulating Hormone (TSH) 2.936


Height (Feet):  5


Height (Inches):  6.00


Weight (Pounds):  217


General Appearance:  no apparent distress


Cardiovascular:  normal rate


Respiratory/Chest:  decreased breath sounds


Abdomen:  soft


Objective


no change











Sawyer Altamirano MD Mar 4, 2020 13:54

## 2020-03-04 NOTE — NUR
CASE MANAGEMENT:REVIEW



59 YR OLD FEMALE WALKED IN TO ER



CC: DIZZINESS, HEADACHE AND FEELING SICK



SI: NSTEMI. CHF. HYPOKALEMIA. PULMONARY EDEMA

97.7   78  16   181/81  99% ON RA

K-2.9  GLUCOSE-72   TROPONIN(+) 0.119   BNP+7330



IS: IV D50W STAT

IV HYDRALAZINE

IV LASIX

IV KCL X2

KCL PO 

URINE CX

MRI BRAIN

CHEST XRAY

**: TO TELEMETRY 

DCP: FROM HOME





3/4/20

SI: ACS. NSTEMI. CAD. AC/CHR CHF

HYPERNATREMIA. HYPOKALEMIA

98.4   80  18  147/86  96% ON RA



IS: PLAVIX PO QD

COZAAR PO QD

IV LASIX QD

HEPARIN SQ Q12

ALDACTONE PO Q12

**: TELEMETRY STATUS

DCP: FROM HOME

## 2020-03-05 VITALS — SYSTOLIC BLOOD PRESSURE: 176 MMHG | DIASTOLIC BLOOD PRESSURE: 91 MMHG

## 2020-03-05 VITALS — SYSTOLIC BLOOD PRESSURE: 139 MMHG | DIASTOLIC BLOOD PRESSURE: 63 MMHG

## 2020-03-05 VITALS — SYSTOLIC BLOOD PRESSURE: 148 MMHG | DIASTOLIC BLOOD PRESSURE: 79 MMHG

## 2020-03-05 VITALS — SYSTOLIC BLOOD PRESSURE: 160 MMHG | DIASTOLIC BLOOD PRESSURE: 75 MMHG

## 2020-03-05 VITALS — DIASTOLIC BLOOD PRESSURE: 85 MMHG | SYSTOLIC BLOOD PRESSURE: 173 MMHG

## 2020-03-05 RX ADMIN — DOCUSATE SODIUM SCH MG: 100 CAPSULE, LIQUID FILLED ORAL at 13:19

## 2020-03-05 RX ADMIN — INSULIN ASPART SCH UNITS: 100 INJECTION, SOLUTION INTRAVENOUS; SUBCUTANEOUS at 16:30

## 2020-03-05 RX ADMIN — SPIRONOLACTONE SCH MG: 25 TABLET, FILM COATED ORAL at 09:07

## 2020-03-05 RX ADMIN — INSULIN ASPART SCH UNITS: 100 INJECTION, SOLUTION INTRAVENOUS; SUBCUTANEOUS at 11:30

## 2020-03-05 RX ADMIN — ENOXAPARIN SODIUM SCH MG: 100 INJECTION SUBCUTANEOUS at 09:10

## 2020-03-05 RX ADMIN — HYDRALAZINE HYDROCHLORIDE PRN MG: 25 TABLET ORAL at 12:00

## 2020-03-05 RX ADMIN — INSULIN ASPART SCH UNITS: 100 INJECTION, SOLUTION INTRAVENOUS; SUBCUTANEOUS at 06:01

## 2020-03-05 RX ADMIN — LOSARTAN POTASSIUM SCH MG: 50 TABLET, FILM COATED ORAL at 09:06

## 2020-03-05 RX ADMIN — DOCUSATE SODIUM SCH MG: 100 CAPSULE, LIQUID FILLED ORAL at 09:07

## 2020-03-05 RX ADMIN — HYDRALAZINE HYDROCHLORIDE PRN MG: 25 TABLET ORAL at 06:34

## 2020-03-05 RX ADMIN — DOCUSATE SODIUM SCH MG: 100 CAPSULE, LIQUID FILLED ORAL at 17:31

## 2020-03-05 NOTE — NEPHROLOGY PROGRESS NOTE
Assessment/Plan


Problem List:  


(1) Hypoalbuminemia


(2) Hypokalemia


(3) Anemia


(4) NSTEMI (non-ST elevated myocardial infarction)


(5) HTN (hypertension)


Assessment





Electrolyte imbalances


Hypokalemia


Hypertension


Hypoalbuminemia and proteinuria


Anemia


Elevated troponin


Diabetes mellitus


Coronary artery disease status post MI


With right-sided facial droop and partial bright blindness


Codeine Metformin penicillin allergy


Plan





No labs today.  Will order for tomorrow.


Add Norvasc for blood pressure.  5 mg daily


24-hour urine for protein collection.  In process


IV iron


Optimize cardiac function 


Correct electrolytes


2D echocardiogram.  Results noted.


Monitor renal parameters


Adjust blood pressure medication


Consultants





Subjective


ROS Limited/Unobtainable:  No





Objective


Objective





Last 24 Hour Vital Signs








  Date Time  Temp Pulse Resp B/P (MAP) Pulse Ox O2 Delivery O2 Flow Rate FiO2


 


3/5/20 12:00    176/91    


 


3/5/20 12:00 98.6 64 18 176/91 (119) 98   


 


3/5/20 11:30  68      


 


3/5/20 09:06  75  139/63    


 


3/5/20 09:06    139/63    


 


3/5/20 09:00      Room Air  


 


3/5/20 09:00      Room Air  


 


3/5/20 08:04  71      


 


3/5/20 08:00 98.2 75 19 139/63 (88) 96   


 


3/5/20 06:34    173/85    


 


3/5/20 04:00 97.5 78 20 173/85 (114) 95   


 


3/5/20 04:00  74      


 


3/5/20 00:00 96.9 76 19 160/75 (103) 97   


 


3/5/20 00:00  78      


 


3/4/20 21:19    187/92    


 


3/4/20 21:18  83  187/92    


 


3/4/20 21:00      Room Air  


 


3/4/20 20:00 98.8 83 22 187/92 (123) 97   


 


3/4/20 20:00  83      


 


3/4/20 17:45  78  154/89    


 


3/4/20 16:00  78      


 


3/4/20 16:00 98.3 78 18 154/89 (110) 96   

















Intake and Output  


 


 3/4/20 3/5/20





 19:00 07:00


 


Intake Total 640 ml 


 


Balance 640 ml 


 


  


 


Intake Oral 640 ml 


 


# Voids 2 2











Current Medications








 Medications


  (Trade)  Dose


 Ordered  Sig/Iman


 Route


 PRN Reason  Start Time


 Stop Time Status Last Admin


Dose Admin


 


 Acetaminophen


  (Tylenol)  650 mg  Q4H  PRN


 ORAL


 Mild Pain/Temp > 100.5  3/3/20 15:45


 4/2/20 15:44  3/3/20 16:10


 


 


 Atorvastatin


 Calcium


  (Lipitor)  80 mg  BEDTIME


 ORAL


   3/3/20 21:00


 4/2/20 20:59  3/4/20 21:18


 


 


 Bisacodyl


  (Dulcolax)  10 mg  DAILYPRN  PRN


 ORAL


 Constipation  3/4/20 13:30


 4/2/20 15:44   


 


 


 Clopidogrel


 Bisulfate


  (Plavix)  75 mg  DAILY


 ORAL


   3/4/20 09:00


 4/3/20 08:59  3/5/20 09:07


 


 


 Dextrose


  (Dextrose 50%)  25 ml  Q30M  PRN


 IV


 Hypoglycemia  3/3/20 16:15


 4/2/20 16:14   


 


 


 Dextrose


  (Dextrose 50%)  50 ml  Q30M  PRN


 IV


 Hypoglycemia  3/3/20 16:15


 4/2/20 16:14   


 


 


 Docusate Sodium


  (Colace)  100 mg  THREE TIMES A  DAY


 ORAL


   3/3/20 18:00


 4/2/20 17:59  3/5/20 13:19


 


 


 Enoxaparin Sodium


  (Lovenox)  100 mg  EVERY 12  HOURS


 SUBQ


   3/4/20 17:00


 4/3/20 16:59  3/5/20 09:10


 


 


 Furosemide


  (Lasix)  40 mg  DAILY


 IV


   3/4/20 09:00


 4/3/20 08:59  3/5/20 09:08


 


 


 Gabapentin


  (Neurontin)  100 mg  THREE TIMES A  DAY


 ORAL


   3/3/20 18:00


 4/2/20 17:59  3/5/20 13:18


 


 


 Hydralazine HCl


  (Apresoline)  25 mg  Q4H  PRN


 ORAL


 bp over 160 syst  3/3/20 15:30


 4/2/20 15:29  3/5/20 12:00


 


 


 Insulin Aspart


  (NovoLOG)    BEFORE MEALS AND  HS


 SUBQ


   3/3/20 17:00


 4/2/20 16:59  3/4/20 21:24


 


 


 Losartan Potassium


  (Cozaar)  100 mg  DAILY


 ORAL


   3/4/20 09:00


 4/3/20 08:59  3/5/20 09:06


 


 


 Metoprolol


 Tartrate


  (Lopressor)  25 mg  Q12HR


 ORAL


   3/4/20 17:00


 4/3/20 16:59  3/5/20 09:06


 


 


 Morphine Sulfate


  (Morphine


 Sulfate)  2 mg  Q4H  PRN


 IVP


 Severe Pain (Pain Scale 7-10)  3/4/20 21:45


 3/11/20 21:44  3/4/20 22:11


 


 


 Ondansetron HCl


  (Zofran)  4 mg  Q6H  PRN


 IVP


 Nausea & Vomiting  3/5/20 11:15


 4/4/20 11:14  3/5/20 11:44


 


 


 Pantoprazole


  (Protonix)  40 mg  EVERY 12  HOURS


 ORAL


   3/3/20 21:00


 4/2/20 20:59  3/5/20 09:06


 


 


 Senna/Docusate


 Sodium


  (Lily-Colace)  1 tab  DAILY


 ORAL


   3/5/20 09:00


 4/4/20 08:59  3/5/20 09:07


 


 


 Spironolactone


  (Aldactone)  25 mg  EVERY 12  HOURS


 ORAL


   3/3/20 21:00


 4/2/20 20:59  3/5/20 09:07


 








Height (Feet):  5


Height (Inches):  6.00


Weight (Pounds):  227


General Appearance:  no apparent distress


Cardiovascular:  normal rate


Respiratory/Chest:  decreased breath sounds


Abdomen:  soft


Objective


no change











Sawyer Altamirano MD Mar 5, 2020 14:05

## 2020-03-05 NOTE — NUR
NURSE NOTES:

Received report from DIONTE Ma and DIONTE Molina. Pt A/Ox4, denies any pain, complains of 
weakness. Pt breathing even and unlabored in RA, instructed the pt to use the nasal cannula 
PRN when experiencing SOB. Bed on lowest position, call light within reach. Will continue to 
monitor.

## 2020-03-05 NOTE — NUR
NURSE NOTES:

Pt picked up by [facility]. Pt in stable condition. Removed tele monitor. Per [facility] RN, 
ok to leave IV on. Discussed transfer plans to the pt. Belongings checked. 

Called [facility] to give report, no one picked up call and stated to call 
later on shift change.

## 2020-03-05 NOTE — NUR
***TRANSFER UPDATE



RECEIVED CALL FROM [facility] HOUSE SUPERVISOR,CHIP

PATIENT HAS BEEN ACCEPTED TO:



[facility]

[address]

T:[phone] FOR NURSE TO NURSE REPORT

[facility] HAS BEEN ARRANGED FOR 1730  ~ [facility]


-------------------------------------------------------------------------------

Addendum: 03/05/20 at 1340 by ANALY RUIZ LVN LVN

-------------------------------------------------------------------------------

DR TAO AND DR BROWN HAVE BEEN NOTIFIED

## 2020-03-05 NOTE — NUR
HAND-OFF: 

Report given to DIONTE Bundy. Patient is awake laying semi fowlers resting comfortably in 
stable condition. There are no signs of distress or pain noted at this time. Plan of care 
endorsed.

## 2020-03-05 NOTE — NUR
*-* INSURANCE *-*



ALL AVAILABLE CLINICALS HAVE BEEN FAXED TO:



[facility]

AUTH# [id number]

FAX ALL CLINICALS  [phone]

## 2020-03-05 NOTE — HEMATOLOGY/ONC PROGRESS NOTE
Assessment/Plan


Assessment/Plan





Assessment and Recs:


# Anemia of chronic disease due to underlying chronic medical issues, 

multifactorial v Gi bleed 


--> Anemia workup has been ordered, rule out gi bleed 


--> No evidence of hemolysis is noted, peripheral smear has been reviewed.


--> Hgb goal >7. Transfuse prn.


--> Epogen or iron at this time is not particularly indicated


--> Medications have been reviewed


--> low threshold for gi evaluation in case has occult +


--> bone marrow biopsy is not indicated given the other more likely causes


# NSTEMI (non-ST elevated myocardial infarction)


--> may need tx to [facility] for heart cath


# Hypokalemia


--> replete with kcl


# CHF (congestive heart failure)


--> diurese per cards


--> imaging as needed


# Pulmonary edema


#  Dvt ppx plavix now and lovenox sq





The timing of this note does not necessarily reflect the time of the patient 

was seen.





Greatly appreciate consultation.





Subjective


Constitutional:  Denies: no symptoms, chills, fever, malaise, weakness, other


HEENT:  Denies: no symptoms, eye pain, blurred vision, tearing, double vision, 

ear pain, ear discharge, nose pain, nose congestion, throat pain, throat 

swelling, mouth pain, mouth swelling, other


Cardiovascular:  Denies: no symptoms, chest pain, edema, irregular heart rate, 

lightheadedness, palpitations, syncope, other


Respiratory:  Denies: no symptoms, cough, shortness of breath, SOB with 

excertion, SOB at rest, sputum, wheezing, other


Gastrointestinal/Abdominal:  Denies: no symptoms, abdomen distended, abdominal 

pain, black stools, tarry stools, blood in stool, constipated, diarrhea, 

difficulty swallowing, nausea, poor appetite, poor fluid intake, rectal bleeding

, vomiting, other


Genitourinary:  Denies: no symptoms, burning, discharge, frequency, flank pain, 

hematuria, incontinence, pain, urgency, other


Endocrine:  Denies: no symptoms, excessive sweating, flushing, intolerance to 

cold, intolerance to heat, increased hunger, increased thirst, increased urine, 

unexplained weight gain, unexplained weight loss, other


Hematologic/Lymphatic:  Denies: no symptoms, anemia, easy bleeding, easy 

bruising, adenopathy, other


Allergies:  


Coded Allergies:  


     CODEINE (Verified  Allergy, Unknown, 3/3/20)


     METFORMIN (Verified  Allergy, Unknown, 3/3/20)


     PENICILLINS (Verified  Allergy, Unknown, 3/3/20)


Subjective


3/5: no events to report, for stress test, last night with back pain, given 

morphine





Objective


Objective





Current Medications








 Medications


  (Trade)  Dose


 Ordered  Sig/Iman


 Route


 PRN Reason  Start Time


 Stop Time Status Last Admin


Dose Admin


 


 Acetaminophen


  (Tylenol)  650 mg  Q4H  PRN


 ORAL


 Mild Pain/Temp > 100.5  3/3/20 15:45


 4/2/20 15:44  3/3/20 16:10


 


 


 Atorvastatin


 Calcium


  (Lipitor)  80 mg  BEDTIME


 ORAL


   3/3/20 21:00


 4/2/20 20:59  3/4/20 21:18


 


 


 Bisacodyl


  (Dulcolax)  10 mg  DAILYPRN  PRN


 ORAL


 Constipation  3/4/20 13:30


 4/2/20 15:44   


 


 


 Clopidogrel


 Bisulfate


  (Plavix)  75 mg  DAILY


 ORAL


   3/4/20 09:00


 4/3/20 08:59  3/4/20 09:27


 


 


 Dextrose


  (Dextrose 50%)  25 ml  Q30M  PRN


 IV


 Hypoglycemia  3/3/20 16:15


 4/2/20 16:14   


 


 


 Dextrose


  (Dextrose 50%)  50 ml  Q30M  PRN


 IV


 Hypoglycemia  3/3/20 16:15


 4/2/20 16:14   


 


 


 Docusate Sodium


  (Colace)  100 mg  THREE TIMES A  DAY


 ORAL


   3/3/20 18:00


 4/2/20 17:59  3/4/20 17:45


 


 


 Enoxaparin Sodium


  (Lovenox)  100 mg  EVERY 12  HOURS


 SUBQ


   3/4/20 17:00


 4/3/20 16:59  3/4/20 17:48


 


 


 Furosemide


  (Lasix)  40 mg  DAILY


 IV


   3/4/20 09:00


 4/3/20 08:59  3/4/20 09:30


 


 


 Gabapentin


  (Neurontin)  100 mg  THREE TIMES A  DAY


 ORAL


   3/3/20 18:00


 4/2/20 17:59  3/4/20 17:45


 


 


 Hydralazine HCl


  (Apresoline)  25 mg  Q4H  PRN


 ORAL


 bp over 160 syst  3/3/20 15:30


 4/2/20 15:29  3/4/20 21:19


 


 


 Insulin Aspart


  (NovoLOG)    BEFORE MEALS AND  HS


 SUBQ


   3/3/20 17:00


 4/2/20 16:59  3/4/20 21:24


 


 


 Losartan Potassium


  (Cozaar)  100 mg  DAILY


 ORAL


   3/4/20 09:00


 4/3/20 08:59  3/4/20 09:29


 


 


 Metoprolol


 Tartrate


  (Lopressor)  25 mg  Q12HR


 ORAL


   3/4/20 17:00


 4/3/20 16:59  3/4/20 21:18


 


 


 Morphine Sulfate


  (Morphine


 Sulfate)  2 mg  Q4H  PRN


 IVP


 Severe Pain (Pain Scale 7-10)  3/4/20 21:45


 3/11/20 21:44  3/4/20 22:11


 


 


 Pantoprazole


  (Protonix)  40 mg  EVERY 12  HOURS


 ORAL


   3/3/20 21:00


 4/2/20 20:59  3/4/20 21:18


 


 


 Senna/Docusate


 Sodium


  (Lily-Colace)  1 tab  DAILY


 ORAL


   3/5/20 09:00


 4/4/20 08:59   


 


 


 Spironolactone


  (Aldactone)  25 mg  EVERY 12  HOURS


 ORAL


   3/3/20 21:00


 4/2/20 20:59  3/4/20 21:18


 











Last 24 Hour Vital Signs








  Date Time  Temp Pulse Resp B/P (MAP) Pulse Ox O2 Delivery O2 Flow Rate FiO2


 


3/5/20 04:00 97.5 78 20 173/85 (114) 95   


 


3/5/20 04:00  74      


 


3/5/20 00:00 96.9 76 19 160/75 (103) 97   


 


3/5/20 00:00  78      


 


3/4/20 21:19    187/92    


 


3/4/20 21:18  83  187/92    


 


3/4/20 21:00      Room Air  


 


3/4/20 20:00 98.8 83 22 187/92 (123) 97   


 


3/4/20 20:00  83      


 


3/4/20 17:45  78  154/89    


 


3/4/20 16:00  78      


 


3/4/20 16:00 98.3 78 18 154/89 (110) 96   


 


3/4/20 12:00  78      


 


3/4/20 12:00 98.4 80 18 147/86 (106) 96   


 


3/4/20 09:29    152/88    


 


3/4/20 08:00  76      


 


3/4/20 08:00 97.5 76 18 152/88 (109) 98   


 


3/4/20 04:00  79      


 


3/4/20 00:00  89      


 


3/3/20 16:00  87      


 


3/3/20 14:53      Room Air  


 


3/3/20 14:40      Room Air  


 


3/3/20 14:00  89      


 


3/3/20 13:45 97.9 87 18 157/76 (103) 97   


 


3/3/20 13:15  88 18 161/69 99 Room Air  


 


3/3/20 12:12  87 18 156/84 96 Room Air  


 


3/3/20 11:09    189/83    


 


3/3/20 10:00  77 18 189/83 96 Room Air  


 


3/3/20 09:30  78 18 181/81 96 Room Air  


 


3/3/20 08:30  78 16   Room Air  


 


3/3/20 07:35 97.7 78 16 148/75 (99) 99 Room Air  

















Intake and Output  


 


 3/4/20 3/5/20





 19:00 07:00


 


Intake Total 640 ml 


 


Balance 640 ml 


 


  


 


Intake Oral 640 ml 


 


# Voids 2 2











Labs








Test


  3/3/20


08:15 3/3/20


10:25 3/4/20


06:35


 


White Blood Count


  8.1 K/UL


(4.8-10.8) 


  6.1 K/UL


(4.8-10.8)


 


Red Blood Count


  3.39 M/UL


(4.20-5.40) 


  3.04 M/UL


(4.20-5.40)


 


Hemoglobin


  9.6 G/DL


(12.0-16.0) 


  8.5 G/DL


(12.0-16.0)


 


Hematocrit


  29.2 %


(37.0-47.0) 


  26.2 %


(37.0-47.0)


 


Mean Corpuscular Volume 86 FL (80-99)   86 FL (80-99) 


 


Mean Corpuscular Hemoglobin


  28.2 PG


(27.0-31.0) 


  28.1 PG


(27.0-31.0)


 


Mean Corpuscular Hemoglobin


Concent 32.8 G/DL


(32.0-36.0) 


  32.6 G/DL


(32.0-36.0)


 


Red Cell Distribution Width


  14.0 %


(11.6-14.8) 


  14.5 %


(11.6-14.8)


 


Platelet Count


  383 K/UL


(150-450) 


  334 K/UL


(150-450)


 


Mean Platelet Volume


  6.6 FL


(6.5-10.1) 


  6.2 FL


(6.5-10.1)


 


Neutrophils (%) (Auto)


  81.2 %


(45.0-75.0) 


  70.8 %


(45.0-75.0)


 


Lymphocytes (%) (Auto)


  9.6 %


(20.0-45.0) 


  15.6 %


(20.0-45.0)


 


Monocytes (%) (Auto)


  7.3 %


(1.0-10.0) 


  9.5 %


(1.0-10.0)


 


Eosinophils (%) (Auto)


  1.6 %


(0.0-3.0) 


  3.5 %


(0.0-3.0)


 


Basophils (%) (Auto)


  0.4 %


(0.0-2.0) 


  0.6 %


(0.0-2.0)


 


Prothrombin Time


  9.8 SEC


(9.30-11.50) 


  


 


 


Prothromb Time International


Ratio 0.9 (0.9-1.1) 


  


  


 


 


Activated Partial


Thromboplast Time 25 SEC (23-33) 


  


  


 


 


Sodium Level


  147 MMOL/L


(136-145) 


  146 MMOL/L


(136-145)


 


Potassium Level


  2.9 MMOL/L


(3.5-5.1) 


  4.0 MMOL/L


(3.5-5.1)


 


Chloride Level


  112 MMOL/L


() 


  114 MMOL/L


()


 


Carbon Dioxide Level


  27 MMOL/L


(21-32) 


  24 MMOL/L


(21-32)


 


Anion Gap


  8 mmol/L


(5-15) 


  8 mmol/L


(5-15)


 


Blood Urea Nitrogen


  12 mg/dL


(7-18) 


  14 mg/dL


(7-18)


 


Creatinine


  1.1 MG/DL


(0.55-1.30) 


  1.1 MG/DL


(0.55-1.30)


 


Estimat Glomerular Filtration


Rate > 60 mL/min


(>60) 


  > 60 mL/min


(>60)


 


Glucose Level


  72 MG/DL


() 


  132 MG/DL


()


 


Calcium Level


  8.0 MG/DL


(8.5-10.1) 


  7.9 MG/DL


(8.5-10.1)


 


Magnesium Level


  2.1 MG/DL


(1.8-2.4) 


  2.0 MG/DL


(1.8-2.4)


 


Total Bilirubin


  0.1 MG/DL


(0.2-1.0) 


  0.1 MG/DL


(0.2-1.0)


 


Aspartate Amino Transf


(AST/SGOT) 64 U/L (15-37) 


  


  51 U/L (15-37) 


 


 


Alanine Aminotransferase


(ALT/SGPT) 65 U/L (12-78) 


  


  60 U/L (12-78) 


 


 


Alkaline Phosphatase


  68 U/L


() 


  61 U/L


()


 


Troponin I


  0.119 ng/mL


(0.000-0.056) 


  0.089 ng/mL


(0.000-0.056)


 


Pro-B-Type Natriuretic Peptide


  7330 pg/mL


(0-125) 


  4991 pg/mL


(0-125)


 


Total Protein


  5.6 G/DL


(6.4-8.2) 


  4.6 G/DL


(6.4-8.2)


 


Albumin


  1.8 G/DL


(3.4-5.0) 


  1.5 G/DL


(3.4-5.0)


 


Globulin 3.8 g/dL   3.1 g/dL 


 


Albumin/Globulin Ratio 0.5 (1.0-2.7)   0.5 (1.0-2.7) 


 


Urine Color  Yellow  


 


Urine Appearance


  


  Slightly


cloudy 


 


 


Urine pH  7 (4.5-8.0)  


 


Urine Specific Gravity


  


  1.010


(1.005-1.035) 


 


 


Urine Protein  4+ (NEGATIVE)  


 


Urine Glucose (UA)  1+ (NEGATIVE)  


 


Urine Ketones


  


  Negative


(NEGATIVE) 


 


 


Urine Blood  4+ (NEGATIVE)  


 


Urine Nitrite


  


  Negative


(NEGATIVE) 


 


 


Urine Bilirubin


  


  Negative


(NEGATIVE) 


 


 


Urine Urobilinogen


  


  Normal MG/DL


(0.0-1.0) 


 


 


Urine Leukocyte Esterase  1+ (NEGATIVE)  


 


Urine RBC


  


  0-2 /HPF (0 -


2) 


 


 


Urine WBC


  


  10-15 /HPF (0


- 2) 


 


 


Urine Squamous Epithelial


Cells 


  Moderate /LPF


(NONE/OCC) 


 


 


Urine Bacteria


  


  Moderate /HPF


(NONE) 


 


 


Hemoglobin A1c


  


  


  5.4 %


(4.3-6.0)


 


Uric Acid


  


  


  6.2 MG/DL


(2.6-7.2)


 


Phosphorus Level


  


  


  3.5 MG/DL


(2.5-4.9)


 


Iron Level


  


  


  24 ug/dL


()


 


Total Iron Binding Capacity


  


  


  239 ug/dL


(250-450)


 


Percent Iron Saturation   10 % (15-50) 


 


Unsaturated Iron Binding


  


  


  215 ug/dL


(112-346)


 


Ferritin


  


  


  210 NG/ML


(8-388)


 


Gamma Glutamyl Transpeptidase   41 U/L (5-85) 


 


Total Creatine Kinase


  


  


  668 U/L


()


 


C-Reactive Protein,


Quantitative 


  


  < 0.4 mg/dL


(0.00-0.90)


 


Triglycerides Level


  


  


  80 MG/DL


()


 


Cholesterol Level


  


  


  147 MG/DL (<


200)


 


LDL Cholesterol


  


  


  88 mg/dL


(<100)


 


HDL Cholesterol


  


  


  42 MG/DL


(40-60)


 


Cholesterol/HDL Ratio   3.5 (3.3-4.4) 


 


Vitamin B12 Level


  


  


  687 PG/ML


(193-986)


 


Folate


  


  


  15.1 NG/ML


(8.6-58.9)


 


Thyroid Stimulating Hormone


(TSH) 


  


  2.936 uiU/mL


(0.358-3.740)








Height (Feet):  5


Height (Inches):  6.00


Weight (Pounds):  227


Objective





Physical Exam:


Vitals: reviewed


General: NAD


HEENT: nc, at


Neck: supple


Chest: clear breath sounds bilaterally


Cardiovascular: RRR, no s3, s4


Abdomen: soft, nontender, nd


Extremities: no cce, normal range of motion


Neuro: alert and oriented











Eric Candelario MD Mar 5, 2020 06:05

## 2020-03-05 NOTE — NUR
NURSE NOTES:



Notified Dr. Bonilla regarding patient having hypoglycemic episode with blood sugar of 60. 
After being given 2 cups of apple juice, blood sugar was noted to be 81. Awaiting callback 
for any further orders.

## 2020-03-05 NOTE — NUR
***TRANSFER UPDATE

TRANSFER ORDER NOTED

FAXED CLINICALS AND REQUEST

Formerly [facility], [facility]-[employer]

F: [phone]



** WILL FOLLOW UP

## 2020-03-06 NOTE — NUR
*-* INSURANCE *-*



DISCHARGE INSTRUCTION FAXED NO DISCHARGER SUMMARY IN THE SYSTEM.



[facility]

AUTH# [id number]

FAX ALL CLINICALS  [phone]

## 2020-03-07 NOTE — DISCHARGE SUMMARY
Discharge Summary


Discharge Summary


_


DATE OF ADMISSION: 3/3/2020





DATE OF DISCHARGE: 3/5/2020 





DISCHARGED BY: Dr. Rico








REASON FOR ADMISSION: 


59 years old female with past medical history of diabetes, coronary artery 

disease, status post MI, CVA, with right-sided facial droop, partial blindness, 

presented to emergency department complaining of feeling sick.  '


Patient reported dizziness and generalized weakness.  


She woke up with a left-sided chest pain , which later  resolved.  


Patient also complained of the leg swelling. 


She denied   shortness of breath.  


She denied focal weakness.


She denies  abdominal pain , nausea or vomiting.  


Patient reported that she was taken off of   Lasix by her doctor. 


Upon evaluation vital signs were stable laboratory work-up revealed no 

leukocytosis , hemoglobin 9.6 , hematocrit 29.2.


Troponin   0.119.  proBNP 7330. EKG showed sinus rhythm , no acute ischemic 

changes.  


Potassium 2.9.  Sodium 147.  


BUN 12, creatinine 1.1.  . 


Urinalysis revealed moderate bacteria and pyuria , +4 protein.  


Chest x-ray demonstrated mild pulmonary vascular congestion.  


Blood sugar 67.  Patient received orange juice , repeated blood sugar was 70,  

she received another orange juice and dextrose D50 .


According to patient , she got 14 units of insulin in the morning for the blood 

sugar  over 300,  but had not  been eating anything since that.  


Patient received IV Lasix for fluid overload,  potassium was replaced , and 

patient subsequently  was admitted for further management.


 


CONSULTANTS:


nephrologist Dr. Altamirano


hematologist/oncologist Dr. Candelario


cardiologist Dr. Chen


 


[facility] COURSE: 


Patient admitted to telemetry floor.  


Patient started on antiplatelet therapy and statin.  


Echocardiogram revealed preserved ejection fraction 55 to 60% with mild left 

ventricular hypertrophy.  


No evidence of pericardial effusion.  


Right ventricular systolic pressure of 40 consistent with a mild pulmonary 

hypertension.  


Moderate mitral regurgitation noted.  


Patient started on diuresis/Lasix with close monitoring of volumes and 

cardiorenal parameters.  


Aldactone added for additional diuresis.


Patient started on full anticoagulation with  Lovenox.  


Second troponin with small trend down,  still elevated.  


Patient was placed on waiting list for transfer to [facility] for cardiac catheterization.


Beta-blockade continued.  


Blood pressure was managed with multiply antihypertensive medications,  

including beta-blocker ,calcium channel blocker ,and angiotensin receptor 

blocker.  


Hydralazine was on board as needed for blood pressure spikes.  


Statin continued.  Lipid panel was stable.  


TSH was within normal limits.  


Renal parameters and electrolytes were closely monitored .


Potassium corrected.


ProBNP from 7330 down to 4991.  


Urine culture revealed Strep agalactiae group B and  mixed  gram-positive 

organisms.  


Patient had  no urinary symptoms , likely asymptomatic bacteriuria .


No antibiotics were given at this time. 


GI prophylaxis provided.  


Bowel regimen instituted.  


Hemoglobin and  hematocrit were closely monitored with goal to keep hemoglobin 

above 7.  


Anemia work-up revealed low iron,  ferritin 210 .


Patient started on the IV iron.  


B12 and folate were stable.  


Transfer was arranged to [facility]  for cardiac 

catheterization .


Patient was stable for transfer via [facility].








FINAL DIAGNOSES: 


Acute coronary syndrome


Possible NSTEMI


Coronary artery disease with   history of prior myocardial infarction


CVA with right-sided weakness and prior CVA x2


Acute on chronic diastolic congestive heart failure


Hypertension


Dehydration


Hypokalemia


Hypernatremia


Anemia of chronic disease


Severe protein calorie malnutrition


Hypoalbuminemia


Proteinuria


Diabetes mellitus





DISCHARGE MEDICATIONS:


List of medication was sent to accepting facility 





DISCHARGE INSTRUCTIONS:


Patient was transferred via [facility] ambulance to [facility].








I have been assigned to dictate discharge summary for this account. 


I was not involved in the patient's management.











Rowan Salinas NP Mar 7, 2020 12:53
